# Patient Record
Sex: MALE | Race: ASIAN | URBAN - METROPOLITAN AREA
[De-identification: names, ages, dates, MRNs, and addresses within clinical notes are randomized per-mention and may not be internally consistent; named-entity substitution may affect disease eponyms.]

---

## 2024-03-09 ENCOUNTER — OFFICE VISIT (OUTPATIENT)
Age: 39
End: 2024-03-09

## 2024-03-09 VITALS
HEART RATE: 91 BPM | WEIGHT: 144.4 LBS | SYSTOLIC BLOOD PRESSURE: 121 MMHG | OXYGEN SATURATION: 95 % | TEMPERATURE: 99.6 F | BODY MASS INDEX: 24.65 KG/M2 | HEIGHT: 64 IN | RESPIRATION RATE: 18 BRPM | DIASTOLIC BLOOD PRESSURE: 76 MMHG

## 2024-03-09 DIAGNOSIS — R05.8 PRODUCTIVE COUGH: ICD-10-CM

## 2024-03-09 DIAGNOSIS — J40 BRONCHITIS: ICD-10-CM

## 2024-03-09 DIAGNOSIS — J01.00 ACUTE NON-RECURRENT MAXILLARY SINUSITIS: Primary | ICD-10-CM

## 2024-03-09 RX ORDER — DOXYCYCLINE HYCLATE 100 MG
100 TABLET ORAL 2 TIMES DAILY
Qty: 14 TABLET | Refills: 0 | Status: SHIPPED | OUTPATIENT
Start: 2024-03-09 | End: 2024-03-16

## 2024-03-09 RX ORDER — ALBUTEROL SULFATE 90 UG/1
2 AEROSOL, METERED RESPIRATORY (INHALATION) 4 TIMES DAILY PRN
Qty: 18 G | Refills: 0 | Status: SHIPPED | OUTPATIENT
Start: 2024-03-09 | End: 2024-03-09

## 2024-03-09 RX ORDER — PREDNISONE 20 MG/1
40 TABLET ORAL DAILY
Qty: 10 TABLET | Refills: 0 | Status: SHIPPED | OUTPATIENT
Start: 2024-03-09 | End: 2024-03-09

## 2024-03-09 RX ORDER — BENZONATATE 200 MG/1
200 CAPSULE ORAL 3 TIMES DAILY PRN
Qty: 21 CAPSULE | Refills: 0 | Status: SHIPPED | OUTPATIENT
Start: 2024-03-09 | End: 2024-03-16

## 2024-03-09 NOTE — PROGRESS NOTES
Anne Cuello (:  1985) is a 38 y.o. male,New patient, here for evaluation of the following chief complaint(s):  Cough (Patient is having productive cough with green sputum. Cough started 2 weeks ago.)      ASSESSMENT/PLAN:  Visit Diagnoses and Associated Orders       Acute non-recurrent maxillary sinusitis    -  Primary    doxycycline hyclate (VIBRA-TABS) 100 MG tablet [2625]           Productive cough        XR CHEST (2 VIEWS) [67638 CPT(R)]   - Future Order    benzonatate (TESSALON) 200 MG capsule [79096]           Bronchitis        benzonatate (TESSALON) 200 MG capsule [37374]           ORDERS WITHOUT AN ASSOCIATED DIAGNOSIS    SIMVASTATIN PO [34952]             VSS, low grade 99.6F fever. CXR with perihilar bronchial wall thickening suggesting bronchiolitis (likely viral etiology) vs RAD. Multilevel endplate degeneration of the thoracic spine greater than expected for age. Imaging results provided to patient.  Lungs RUL rhonchi that clears with cough. Maxillary and frontal sinus TTP symptoms present 14 days. To treat for acute maxillary sinusitis with doxycycline BID x 7 days. To treat for bronchitis with benzonatate 200 mg TID PRN for cough. May continue to use Mucinex PRN. May use nasal saline, cool mist humidifier, rest, increased fluid intake for symptomatic relief. To monitor symptoms and follow-up if symptoms worsen or do not improve on current treatment plan. To ED for severe CP, chest tightness, SOB, rapid worsening of symptoms. Patient verbalized understanding, no questions or concerns at this time .        Follow up in PRN days if symptoms persist or if symptoms worsen.    SUBJECTIVE/OBJECTIVE:  HPI  HPI:   38 y.o. male presents with symptoms of productive cough with green/grey sputum, chest congestion, body aches, nasal congestion with purulent nasal discharge, PND with throat irritation, fever, SOB, wheezing, x 2 weeks. Denies pain. Using Mucinex, tylenol with mild improvement in

## 2024-06-18 ENCOUNTER — OFFICE VISIT (OUTPATIENT)
Age: 39
End: 2024-06-18

## 2024-06-18 VITALS
OXYGEN SATURATION: 98 % | RESPIRATION RATE: 18 BRPM | DIASTOLIC BLOOD PRESSURE: 87 MMHG | HEART RATE: 74 BPM | WEIGHT: 142 LBS | BODY MASS INDEX: 24.24 KG/M2 | HEIGHT: 64 IN | SYSTOLIC BLOOD PRESSURE: 137 MMHG | TEMPERATURE: 98 F

## 2024-06-18 DIAGNOSIS — M62.830 SPASM OF THORACIC BACK MUSCLE: ICD-10-CM

## 2024-06-18 DIAGNOSIS — M54.6 THORACIC SPINE PAIN: ICD-10-CM

## 2024-06-18 DIAGNOSIS — S46.812A TRAPEZIUS STRAIN, LEFT, INITIAL ENCOUNTER: Primary | ICD-10-CM

## 2024-06-18 RX ORDER — SIMVASTATIN 5 MG
5 TABLET ORAL EVERY EVENING
COMMUNITY
Start: 2024-05-30

## 2024-06-18 RX ORDER — CYCLOBENZAPRINE HCL 10 MG
5-10 TABLET ORAL 3 TIMES DAILY PRN
Qty: 21 TABLET | Refills: 0 | Status: SHIPPED | OUTPATIENT
Start: 2024-06-18 | End: 2024-06-28

## 2024-06-18 RX ORDER — CHOLECALCIFEROL (VITAMIN D3) 25 MCG
1 TABLET ORAL
COMMUNITY
Start: 2024-06-05 | End: 2024-09-03

## 2024-06-18 RX ORDER — KETOROLAC TROMETHAMINE 30 MG/ML
30 INJECTION, SOLUTION INTRAMUSCULAR; INTRAVENOUS ONCE
Status: COMPLETED | OUTPATIENT
Start: 2024-06-18 | End: 2024-06-18

## 2024-06-18 RX ADMIN — KETOROLAC TROMETHAMINE 30 MG: 30 INJECTION, SOLUTION INTRAMUSCULAR; INTRAVENOUS at 11:27

## 2024-06-18 NOTE — PROGRESS NOTES
Anne Cuello (:  1985) is a 38 y.o. male,Established patient, here for evaluation of the following chief complaint(s):  Back Pain (pain in middle back left side since yesterday- sharp shooting pain, having back spasms, when trying to move it hurts in the center of the back. /)    Assessment & Plan :  Visit Diagnoses and Associated Orders       Trapezius strain, left, initial encounter    -  Primary    ketorolac (TORADOL) injection 30 mg [90644]           Spasm of thoracic back muscle        cyclobenzaprine (FLEXERIL) 10 MG tablet [2017]      Ananda Parrish MD, Orthopedic Surgery (back, neck, spine),[place] (Pending sale to [facility]) [[id number] Custom]           Thoracic spine pain        XR THORACIC SPINE (2 VIEWS) [59626 CPT(R)]   - Future Order    Ananda Parrish MD, Orthopedic Surgery (back, neck, spine),[place] (Pending sale to [facility]) [[id number] Custom]           ORDERS WITHOUT AN ASSOCIATED DIAGNOSIS    Cholecalciferol (VITAMIN D3) 25 MCG TABS [220040]      simvastatin (ZOCOR) 5 MG tablet [66688]                 Able to ambulate to the exam table without assistance. XR thoracic spine without evidence of acute fracture or significant degenerative change. Suspect L sided trapezius strain with spasm. Toradol 30 mg IM administered on scene for acute pain.  To hold all NSAIDs for the next 24 hours post toradol injection. May continue tylenol 650 mg Q6H PRN and lidocaine patches (on 12 hours off 12 hours) as needed for pain. To use heat for 20 minutes Q2H as needed for comfort and pain control - avoiding falling asleep on the heating pad. To perform gentle stretching and participate in activity as tolerated. To follow-up with PCP or orthopedic spine if symptoms worsen or do not improve on current treatment plan. To ED for severe back pain unresponsive to current treatment regimen, numbness/weakness/tingling of an extremity, perianal numbness, new bowel/bladder incontinence. Patient verbalized understanding, no

## 2024-08-15 ENCOUNTER — OFFICE VISIT (OUTPATIENT)
Age: 39
End: 2024-08-15
Payer: COMMERCIAL

## 2024-08-15 VITALS
DIASTOLIC BLOOD PRESSURE: 74 MMHG | BODY MASS INDEX: 24.59 KG/M2 | HEIGHT: 64 IN | WEIGHT: 144 LBS | TEMPERATURE: 98 F | OXYGEN SATURATION: 98 % | SYSTOLIC BLOOD PRESSURE: 116 MMHG | HEART RATE: 82 BPM

## 2024-08-15 DIAGNOSIS — E78.2 MIXED HYPERLIPIDEMIA: ICD-10-CM

## 2024-08-15 DIAGNOSIS — E55.9 VITAMIN D DEFICIENCY, UNSPECIFIED: ICD-10-CM

## 2024-08-15 DIAGNOSIS — E06.3 THYROIDITIS, AUTOIMMUNE: ICD-10-CM

## 2024-08-15 DIAGNOSIS — Z00.00 ENCOUNTER FOR WELL ADULT EXAM WITHOUT ABNORMAL FINDINGS: Primary | ICD-10-CM

## 2024-08-15 DIAGNOSIS — R73.03 PREDIABETES: ICD-10-CM

## 2024-08-15 PROCEDURE — 99385 PREV VISIT NEW AGE 18-39: CPT | Performed by: INTERNAL MEDICINE

## 2024-08-15 SDOH — ECONOMIC STABILITY: FOOD INSECURITY: WITHIN THE PAST 12 MONTHS, YOU WORRIED THAT YOUR FOOD WOULD RUN OUT BEFORE YOU GOT MONEY TO BUY MORE.: NEVER TRUE

## 2024-08-15 SDOH — ECONOMIC STABILITY: INCOME INSECURITY: HOW HARD IS IT FOR YOU TO PAY FOR THE VERY BASICS LIKE FOOD, HOUSING, MEDICAL CARE, AND HEATING?: NOT HARD AT ALL

## 2024-08-15 SDOH — ECONOMIC STABILITY: FOOD INSECURITY: WITHIN THE PAST 12 MONTHS, THE FOOD YOU BOUGHT JUST DIDN'T LAST AND YOU DIDN'T HAVE MONEY TO GET MORE.: NEVER TRUE

## 2024-08-15 ASSESSMENT — PATIENT HEALTH QUESTIONNAIRE - PHQ9
SUM OF ALL RESPONSES TO PHQ QUESTIONS 1-9: 0
SUM OF ALL RESPONSES TO PHQ QUESTIONS 1-9: 0
2. FEELING DOWN, DEPRESSED OR HOPELESS: NOT AT ALL
SUM OF ALL RESPONSES TO PHQ9 QUESTIONS 1 & 2: 0
SUM OF ALL RESPONSES TO PHQ QUESTIONS 1-9: 0
SUM OF ALL RESPONSES TO PHQ QUESTIONS 1-9: 0
1. LITTLE INTEREST OR PLEASURE IN DOING THINGS: NOT AT ALL

## 2024-08-15 ASSESSMENT — ENCOUNTER SYMPTOMS
COUGH: 0
SORE THROAT: 0
COLOR CHANGE: 0
ABDOMINAL PAIN: 0
BACK PAIN: 0
DIARRHEA: 0
VOMITING: 0
SHORTNESS OF BREATH: 0
SINUS PRESSURE: 0
WHEEZING: 0
NAUSEA: 0
CHEST TIGHTNESS: 0
EYE ITCHING: 0

## 2024-08-15 NOTE — PROGRESS NOTES
Social History     Tobacco Use    Smoking status: Never     Passive exposure: Never    Smokeless tobacco: Never   Vaping Use    Vaping status: Never Used   Substance Use Topics    Alcohol use: Not Currently     Alcohol/week: 1.0 standard drink of alcohol     Types: 1 Drinks containing 0.5 oz of alcohol per week    Drug use: Never           Objective   Vital Signs  /74 (Site: Left Upper Arm, Position: Sitting, Cuff Size: Medium Adult)   Pulse 82   Temp 98 °F (36.7 °C) (Temporal)   Ht 1.626 m (5' 4\")   Wt 65.3 kg (144 lb)   SpO2 98%   BMI 24.72 kg/m²     Wt Readings from Last 3 Encounters:   08/15/24 65.3 kg (144 lb)   06/18/24 64.4 kg (142 lb)   03/09/24 65.5 kg (144 lb 6.4 oz)       Physical Exam  Vitals and nursing note reviewed.   Constitutional:       Appearance: Normal appearance.   HENT:      Head: Normocephalic and atraumatic.      Right Ear: Tympanic membrane normal.      Left Ear: Tympanic membrane normal.      Nose: Nose normal.      Mouth/Throat:      Mouth: Mucous membranes are moist.   Eyes:      Extraocular Movements: Extraocular movements intact.      Conjunctiva/sclera: Conjunctivae normal.      Pupils: Pupils are equal, round, and reactive to light.   Cardiovascular:      Rate and Rhythm: Normal rate and regular rhythm.      Pulses: Normal pulses.      Heart sounds: Normal heart sounds.   Pulmonary:      Effort: Pulmonary effort is normal.      Breath sounds: Normal breath sounds.   Abdominal:      General: Abdomen is flat. Bowel sounds are normal.      Palpations: Abdomen is soft.   Musculoskeletal:         General: Normal range of motion.      Cervical back: Normal range of motion and neck supple.   Skin:     General: Skin is warm.      Capillary Refill: Capillary refill takes less than 2 seconds.   Neurological:      General: No focal deficit present.      Mental Status: He is alert and oriented to person, place, and time. Mental status is at baseline.   Psychiatric:         Mood

## 2024-08-16 PROBLEM — E55.9 VITAMIN D DEFICIENCY, UNSPECIFIED: Status: ACTIVE | Noted: 2024-08-16

## 2024-08-16 PROBLEM — E06.3 THYROIDITIS, AUTOIMMUNE: Status: ACTIVE | Noted: 2024-08-16

## 2024-11-26 ENCOUNTER — TELEPHONE (OUTPATIENT)
Age: 39
End: 2024-11-26

## 2024-11-26 DIAGNOSIS — Z77.011 CONTACT WITH AND (SUSPECTED) EXPOSURE TO LEAD: Primary | ICD-10-CM

## 2024-11-26 NOTE — TELEPHONE ENCOUNTER
Patient is requesting a lead test, as his daughter has had two positive tests when screened.    Send order to [facility] inside [facility] on [address]

## 2024-11-27 ENCOUNTER — TELEPHONE (OUTPATIENT)
Age: 39
End: 2024-11-27

## 2024-11-27 NOTE — TELEPHONE ENCOUNTER
Pt wife called asking if  pt labs order can be fax over to lab "[facility]. I went ahead and fax over fax are complete. Also check with nurse Urbano and got the ok that they will get the result.    Lab "[facility] [phone]

## 2025-03-27 ENCOUNTER — TELEPHONE (OUTPATIENT)
Age: 40
End: 2025-03-27

## 2025-03-27 NOTE — TELEPHONE ENCOUNTER
Pt wife called and ask if the lab order for lead that is still in the system [phone].-TM 3/27/25

## 2025-04-11 ENCOUNTER — LAB (OUTPATIENT)
Age: 40
End: 2025-04-11

## 2025-04-11 DIAGNOSIS — Z77.011 CONTACT WITH AND (SUSPECTED) EXPOSURE TO LEAD: ICD-10-CM

## 2025-04-15 LAB — LEAD BLDV-MCNC: 10.8 UG/DL (ref 0–3.4)

## 2025-04-30 ENCOUNTER — RESULTS FOLLOW-UP (OUTPATIENT)
Age: 40
End: 2025-04-30

## 2025-04-30 NOTE — RESULT ENCOUNTER NOTE
S/w patient. He says he will call back to schedule appt with Dr. Cuadra. They are having their water tested for lead and should have results in a week or so. Patient would like to schedule appt after that.